# Patient Record
Sex: MALE | Race: OTHER | HISPANIC OR LATINO
[De-identification: names, ages, dates, MRNs, and addresses within clinical notes are randomized per-mention and may not be internally consistent; named-entity substitution may affect disease eponyms.]

---

## 2023-06-05 ENCOUNTER — APPOINTMENT (OUTPATIENT)
Dept: INTERNAL MEDICINE | Facility: CLINIC | Age: 43
End: 2023-06-05
Payer: MEDICAID

## 2023-06-05 ENCOUNTER — OUTPATIENT (OUTPATIENT)
Dept: OUTPATIENT SERVICES | Facility: HOSPITAL | Age: 43
LOS: 1 days | End: 2023-06-05

## 2023-06-05 VITALS
DIASTOLIC BLOOD PRESSURE: 80 MMHG | WEIGHT: 210 LBS | SYSTOLIC BLOOD PRESSURE: 130 MMHG | OXYGEN SATURATION: 97 % | BODY MASS INDEX: 31.1 KG/M2 | HEART RATE: 67 BPM | HEIGHT: 69 IN

## 2023-06-05 DIAGNOSIS — Z00.00 ENCOUNTER FOR GENERAL ADULT MEDICAL EXAMINATION W/OUT ABNORMAL FINDINGS: ICD-10-CM

## 2023-06-05 PROCEDURE — 93010 ELECTROCARDIOGRAM REPORT: CPT

## 2023-06-05 PROCEDURE — 99204 OFFICE O/P NEW MOD 45 MIN: CPT

## 2023-06-05 RX ORDER — SYRING-NEEDL,DISP,INSUL,0.3 ML 30 GX5/16"
SYRINGE, EMPTY DISPOSABLE MISCELLANEOUS
Qty: 1 | Refills: 0 | Status: ACTIVE | COMMUNITY
Start: 2023-06-05 | End: 1900-01-01

## 2023-06-05 RX ORDER — BLOOD-GLUCOSE METER
W/DEVICE KIT MISCELLANEOUS
Qty: 1 | Refills: 0 | Status: ACTIVE | COMMUNITY
Start: 2023-06-05 | End: 1900-01-01

## 2023-06-05 NOTE — PHYSICAL EXAM
[No Acute Distress] : no acute distress [PERRL] : pupils equal round and reactive to light [EOMI] : extraocular movements intact [Normal Outer Ear/Nose] : the outer ears and nose were normal in appearance [Supple] : supple [No Respiratory Distress] : no respiratory distress  [No Accessory Muscle Use] : no accessory muscle use [Clear to Auscultation] : lungs were clear to auscultation bilaterally [Normal Rate] : normal rate  [Regular Rhythm] : with a regular rhythm [Normal S1, S2] : normal S1 and S2 [Pedal Pulses Present] : the pedal pulses are present [No Edema] : there was no peripheral edema [Soft] : abdomen soft [Non Tender] : non-tender [Non-distended] : non-distended [Normal Bowel Sounds] : normal bowel sounds [No Joint Swelling] : no joint swelling [Grossly Normal Strength/Tone] : grossly normal strength/tone [No Focal Deficits] : no focal deficits [Normal Affect] : the affect was normal [Normal Insight/Judgement] : insight and judgment were intact [de-identified] : R TM with irregular surface; crowded oropharynx-Mallampati score IV [de-identified] : no skin breakdown noted. Palm test wnl

## 2023-06-05 NOTE — INTERPRETER SERVICES
[Patient Declined  Services] : - None: Patient declined  services [FreeTextEntry3] : Pt prefers to speak in Khmer w provider  [TWNoteComboBox1] : [place]ayan

## 2023-06-05 NOTE — PLAN
[FreeTextEntry1] : \par Patient is a 42 year old M with HTN, HLD, DM, fatty liver disease, ?gout coming in to establish care \par \par #snoring\par - possibly in the setting of DIALLO\par - will refer to sleep medicine\par \par #HTN\par - BP slightly elevated, reports not taking BP meds today\par - cont to monitor at home\par - meds recent\par -will obtain UA today, EKG at another visit\par \par #DM2\par - will obtain A1c, albumin/cr U, B12 \par - on metformin 1000mg and a gliptin from  (combo pill)\par -will discussed medication management next time, may benefit for GLP1 a\par - discussed diabetic diet, declines nutrition consult \par -diabetes supplies sent \par \par #HLD\par - will obtain lipid profile \par -will discuss statin initiation during next visit \par \par #gout?\par -not consistent with history and physical exam\par - will obtain uric acid and cont to monitor for symps\par \par #smoking\par -discussed risks of smoking and benefits of quitting\par - pt not interested at this time\par \par #feet pain\par - advised to wear good supporting shoes, avoid prolonged standing, wt loss\par -podiatry referral \par \par #TM abnormality\par - pt not interested in evaluating at this time, declines ENT consultation\par \par #HCM\par -advised to provide vaccination and medical records\par \par f/u in 1 wk for TTM \par

## 2023-06-05 NOTE — HISTORY OF PRESENT ILLNESS
[FreeTextEntry1] : establish care  [de-identified] : \par Patient is a 42 year old M with HTN, HLD, DM, fatty liver disease, ?gout coming in to establish care \par \par Patient reports issues with his sleep, wakes up fatigued. Endorses snoring. Endorses that he stops breathing and gasps for air in his sleep. Wakes up 2-3 times in the night- sometimes he needs to urinate, sometimes the snores wake him up. Endorses dry mouth in the AM but no HA. \par \par Patient reports smoking for 20 yrs, smokes 10-20 cigs a day. Reports he has tried to quit before but then resumes. Triggers include going to the [employer] or watching sports. Has not tried any medication for this. Not interested in quitting at this time. \par \par Pt reports feeling tired and pain on the plants of his feet after standing for long time. Endorses having flat feet. Tries to wear supportive shoes although noted to be wearing flat sandals today. \par \par Pt reports being diagnosed with DM2 in [place]. Pt does not check his FS. Denies any increased thirst, wt loss, or urinary frequency. Pt reports eating carb rich foods including white rice, eats multiple times a day. Does not exercise. Not interested in following with a nutritionist. Has not followed with a podiatry or ophthalmologist. \par \par Pt reports being told he had gout but denies any joint complaints and was never on any treatment for this.

## 2023-06-06 DIAGNOSIS — I10 ESSENTIAL (PRIMARY) HYPERTENSION: ICD-10-CM

## 2023-06-06 DIAGNOSIS — E11.9 TYPE 2 DIABETES MELLITUS WITHOUT COMPLICATIONS: ICD-10-CM

## 2023-06-06 DIAGNOSIS — Z00.00 ENCOUNTER FOR GENERAL ADULT MEDICAL EXAMINATION WITHOUT ABNORMAL FINDINGS: ICD-10-CM

## 2023-06-06 DIAGNOSIS — R06.83 SNORING: ICD-10-CM

## 2023-06-06 LAB
25(OH)D3 SERPL-MCNC: 27.9 NG/ML
ALBUMIN SERPL ELPH-MCNC: 5 G/DL
ALP BLD-CCNC: 78 U/L
ALT SERPL-CCNC: 32 U/L
ANION GAP SERPL CALC-SCNC: 14 MMOL/L
APPEARANCE: CLEAR
AST SERPL-CCNC: 22 U/L
BACTERIA: NEGATIVE /HPF
BASOPHILS # BLD AUTO: 0.01 K/UL
BASOPHILS NFR BLD AUTO: 0.1 %
BILIRUB SERPL-MCNC: 0.5 MG/DL
BILIRUBIN URINE: NEGATIVE
BLOOD URINE: NEGATIVE
BUN SERPL-MCNC: 17 MG/DL
C TRACH RRNA SPEC QL NAA+PROBE: NOT DETECTED
CALCIUM SERPL-MCNC: 9.6 MG/DL
CAST: 4 /LPF
CHLORIDE SERPL-SCNC: 101 MMOL/L
CHOLEST SERPL-MCNC: 247 MG/DL
CO2 SERPL-SCNC: 26 MMOL/L
COLOR: NORMAL
CREAT SERPL-MCNC: 0.85 MG/DL
CREAT SPEC-SCNC: 352 MG/DL
EGFR: 110 ML/MIN/1.73M2
EOSINOPHIL # BLD AUTO: 0.18 K/UL
EOSINOPHIL NFR BLD AUTO: 2.2 %
EPITHELIAL CELLS: 1 /HPF
ESTIMATED AVERAGE GLUCOSE: 140 MG/DL
GLUCOSE QUALITATIVE U: NEGATIVE MG/DL
GLUCOSE SERPL-MCNC: 155 MG/DL
HBA1C MFR BLD HPLC: 6.5 %
HCT VFR BLD CALC: 44.2 %
HDLC SERPL-MCNC: 33 MG/DL
HGB BLD-MCNC: 15.1 G/DL
HIV1+2 AB SPEC QL IA.RAPID: NONREACTIVE
IMM GRANULOCYTES NFR BLD AUTO: 0.4 %
KETONES URINE: ABNORMAL MG/DL
LDLC SERPL CALC-MCNC: 149 MG/DL
LDLC SERPL DIRECT ASSAY-MCNC: 160 MG/DL
LEUKOCYTE ESTERASE URINE: NEGATIVE
LYMPHOCYTES # BLD AUTO: 3.41 K/UL
LYMPHOCYTES NFR BLD AUTO: 40.8 %
MAN DIFF?: NORMAL
MCHC RBC-ENTMCNC: 29.1 PG
MCHC RBC-ENTMCNC: 34.2 GM/DL
MCV RBC AUTO: 85.2 FL
MICROALBUMIN 24H UR DL<=1MG/L-MCNC: 32.3 MG/DL
MICROALBUMIN/CREAT 24H UR-RTO: 92 MG/G
MICROSCOPIC-UA: NORMAL
MONOCYTES # BLD AUTO: 0.48 K/UL
MONOCYTES NFR BLD AUTO: 5.7 %
N GONORRHOEA RRNA SPEC QL NAA+PROBE: NOT DETECTED
NEUTROPHILS # BLD AUTO: 4.24 K/UL
NEUTROPHILS NFR BLD AUTO: 50.8 %
NITRITE URINE: NEGATIVE
NONHDLC SERPL-MCNC: 214 MG/DL
PH URINE: 5.5
PLATELET # BLD AUTO: 309 K/UL
POTASSIUM SERPL-SCNC: 3.9 MMOL/L
PROT SERPL-MCNC: 7.6 G/DL
PROTEIN URINE: 100 MG/DL
RBC # BLD: 5.19 M/UL
RBC # FLD: 13.2 %
RED BLOOD CELLS URINE: 1 /HPF
SODIUM SERPL-SCNC: 141 MMOL/L
SOURCE AMPLIFICATION: NORMAL
SPECIFIC GRAVITY URINE: 1.03
T PALLIDUM AB SER QL IA: NEGATIVE
TRIGL SERPL-MCNC: 322 MG/DL
URATE SERPL-MCNC: 7.9 MG/DL
UROBILINOGEN URINE: 0.2 MG/DL
VIT B12 SERPL-MCNC: 557 PG/ML
WBC # FLD AUTO: 8.35 K/UL
WHITE BLOOD CELLS URINE: 2 /HPF

## 2023-06-07 LAB
HBV CORE IGG+IGM SER QL: REACTIVE
HBV SURFACE AB SER QL: REACTIVE
HBV SURFACE AG SER QL: NONREACTIVE
HCV AB SER QL: NONREACTIVE
HCV S/CO RATIO: 0.11 S/CO

## 2023-06-20 ENCOUNTER — APPOINTMENT (OUTPATIENT)
Dept: INTERNAL MEDICINE | Facility: CLINIC | Age: 43
End: 2023-06-20
Payer: MEDICAID

## 2023-06-20 ENCOUNTER — OUTPATIENT (OUTPATIENT)
Dept: OUTPATIENT SERVICES | Facility: HOSPITAL | Age: 43
LOS: 1 days | End: 2023-06-20

## 2023-06-20 VITALS — HEIGHT: 69 IN | BODY MASS INDEX: 31.1 KG/M2 | WEIGHT: 210 LBS

## 2023-06-20 PROCEDURE — ZZZZZ: CPT

## 2023-06-20 NOTE — PLAN
[FreeTextEntry1] : \par Patient is a 42 year old M with HTN, HLD, DM, fatty liver disease, coming in to discuss lab results and telephonic check\par \par #DM2\par - A1c 6.5\par - cont on metformin 1000mg BID \par - may benefit for GLP1 a given obesity, but will discuss that at an in-person visit \par - declined nutritional consultation\par -diabetes supplies sent last time \par \par #snoring\par - possibly in the setting of DIALLO\par - was referred to sleep medicine \par \par #HTN\par - on amlodipine 10mg and ibersartan 300mg daily \par -will obtain UA today, EKG at another visit \par \par #HLD\par - LDL-160, ASCVD score 25.96%\par - will given smoking, DM2, and elevated risk will initiate high dose statin  \par \par #smoking\par -will cont to address next time \par - pt expressed no interest in quitting \par \par #feet pain\par -podiatry referral provided last visit \par \par #TM abnormality\par - pt not interested in evaluating at this time, declines ENT consultation\par \par #vit D insufficiency\par - sent vitamin D 1,000 U q D. \par \par #HCM\par -advised to provide vaccination and medical records\par \par f/u in  1 mo

## 2023-06-20 NOTE — HISTORY OF PRESENT ILLNESS
[Home] : at home, [unfilled] , at the time of the visit. [Medical Office: ([facility])___] : at the medical office located in  [Verbal consent obtained from patient] : the patient, [unfilled] [de-identified] : \par Patient is a 42 year old M with HTN, HLD, DM, fatty liver disease coming in for a follow up.\par \par  Requests sleep medicine referral as it was not provided last time. Reports that he is concerned about his sleep. Patient denies any other acute concern.\par \par Pt reports not currently taking any medication for his diabetes at this time. Was previously taking metformin/gliptin combo pill from . Endorses compliance with BP medication.

## 2023-06-21 DIAGNOSIS — E78.5 HYPERLIPIDEMIA, UNSPECIFIED: ICD-10-CM

## 2023-06-21 DIAGNOSIS — I10 ESSENTIAL (PRIMARY) HYPERTENSION: ICD-10-CM

## 2023-06-21 DIAGNOSIS — E55.9 VITAMIN D DEFICIENCY, UNSPECIFIED: ICD-10-CM

## 2023-06-21 DIAGNOSIS — E11.9 TYPE 2 DIABETES MELLITUS WITHOUT COMPLICATIONS: ICD-10-CM

## 2023-06-21 DIAGNOSIS — R06.83 SNORING: ICD-10-CM

## 2023-07-13 ENCOUNTER — OUTPATIENT (OUTPATIENT)
Dept: OUTPATIENT SERVICES | Facility: HOSPITAL | Age: 43
LOS: 1 days | End: 2023-07-13

## 2023-07-13 ENCOUNTER — NON-APPOINTMENT (OUTPATIENT)
Age: 43
End: 2023-07-13

## 2023-07-13 ENCOUNTER — APPOINTMENT (OUTPATIENT)
Dept: INTERNAL MEDICINE | Facility: CLINIC | Age: 43
End: 2023-07-13
Payer: MEDICAID

## 2023-07-13 VITALS
RESPIRATION RATE: 16 BRPM | OXYGEN SATURATION: 97 % | SYSTOLIC BLOOD PRESSURE: 114 MMHG | TEMPERATURE: 97.4 F | HEART RATE: 75 BPM | BODY MASS INDEX: 30.81 KG/M2 | HEIGHT: 69 IN | DIASTOLIC BLOOD PRESSURE: 68 MMHG | WEIGHT: 208 LBS

## 2023-07-13 PROCEDURE — 99214 OFFICE O/P EST MOD 30 MIN: CPT

## 2023-07-13 RX ORDER — METFORMIN HYDROCHLORIDE 1000 MG/1
1000 TABLET, COATED ORAL TWICE DAILY
Qty: 2 | Refills: 0 | Status: DISCONTINUED | COMMUNITY
Start: 2023-06-05 | End: 2023-07-13

## 2023-07-13 RX ORDER — BLOOD-GLUCOSE METER
KIT MISCELLANEOUS
Qty: 1 | Refills: 0 | Status: ACTIVE | COMMUNITY
Start: 2023-06-05 | End: 1900-01-01

## 2023-07-13 NOTE — PHYSICAL EXAM
[No Acute Distress] : no acute distress [Supple] : supple [No Respiratory Distress] : no respiratory distress  [No Accessory Muscle Use] : no accessory muscle use [Normal Rate] : normal rate  [Regular Rhythm] : with a regular rhythm [Normal S1, S2] : normal S1 and S2 [No Edema] : there was no peripheral edema [Soft] : abdomen soft [Non Tender] : non-tender [Non-distended] : non-distended [Normal Bowel Sounds] : normal bowel sounds [Grossly Normal Strength/Tone] : grossly normal strength/tone

## 2023-07-13 NOTE — PLAN
[FreeTextEntry1] : \par Patient is a 42 year old M with HTN, HLD, DM, fatty liver disease, coming in for f/u\par \par #DM2\par - A1c 6.5 in June\par - will switch to metformin ER 500mg BID as last A1c was controlled, not interested in any other medication\par - declined nutritional consultation, offered referral, discussed healthy eating \par -diabetes supplies sent last time, does not measure FS,  advised to monitor FS  given that meds were adjusted\par -pending to travel to [place] if unable to make it to appt, should  f/u w  in [place]. \par \par #snoring\par - possibly in the setting of DIALLO\par - was referred to sleep medicine \par \par #HTN\par - on amlodipine 10mg and ibersartan 300mg daily \par - EKG today NSR wnl\par -UA and electrolyte panel stable last time \par -BP monitor sent again, advised to monitor BP at home \par \par #HLD\par - LDL-160, ASCVD score 25.96%\par - on statin\par - lipid panel/CMP \par \par #smoking\par - pt expressed no interest in quitting \par - will cont to monitor\par \par \par #TM abnormality\par - pt was not interested in further eval by ENT\par \par #vit D insufficiency\par - sent vitamin D 1,000 U q D. \par \par #HCM\par -advised to provide vaccination and medical records\par \par f/u in  2 mo

## 2023-07-13 NOTE — HISTORY OF PRESENT ILLNESS
[FreeTextEntry1] : f/u [de-identified] : \par Patient is a 42 year old M with HTN, HLD, DM, fatty liver disease coming in for a follow up.\par \par Patient reports having GI side effects of metformin- stomach up sent. Interested in cutting down on this medication. Not interested in GLP-1 a or other medication. Pt reports having issues w healthy eating. Pt reports eating rice, beef, pasta, bread. Pt is interested in healthy eating but declines talking to nutritionist today. Pt does not check his FS as it hurts his fingers. \par \par Endorses compliance with BP medication but does not check his BP.

## 2023-07-13 NOTE — INTERPRETER SERVICES
[Patient Declined  Services] : - None: Patient declined  services [FreeTextEntry3] : Pt prefers to speak in Tamaz[place] w provider [TWNoteComboBox1] : Burkinan

## 2023-07-14 DIAGNOSIS — E11.9 TYPE 2 DIABETES MELLITUS WITHOUT COMPLICATIONS: ICD-10-CM

## 2023-07-14 DIAGNOSIS — E78.5 HYPERLIPIDEMIA, UNSPECIFIED: ICD-10-CM

## 2023-07-14 DIAGNOSIS — I10 ESSENTIAL (PRIMARY) HYPERTENSION: ICD-10-CM

## 2023-07-14 LAB
ALBUMIN SERPL ELPH-MCNC: 5 G/DL
ALP BLD-CCNC: 73 U/L
ALT SERPL-CCNC: 28 U/L
ANION GAP SERPL CALC-SCNC: 13 MMOL/L
AST SERPL-CCNC: 21 U/L
BILIRUB SERPL-MCNC: 0.4 MG/DL
BUN SERPL-MCNC: 21 MG/DL
CALCIUM SERPL-MCNC: 9.6 MG/DL
CHLORIDE SERPL-SCNC: 104 MMOL/L
CHOLEST SERPL-MCNC: 129 MG/DL
CO2 SERPL-SCNC: 24 MMOL/L
CREAT SERPL-MCNC: 0.89 MG/DL
EGFR: 108 ML/MIN/1.73M2
GLUCOSE SERPL-MCNC: 118 MG/DL
HDLC SERPL-MCNC: 33 MG/DL
LDLC SERPL CALC-MCNC: 77 MG/DL
LDLC SERPL DIRECT ASSAY-MCNC: 72 MG/DL
NONHDLC SERPL-MCNC: 96 MG/DL
POTASSIUM SERPL-SCNC: 4.4 MMOL/L
PROT SERPL-MCNC: 7.5 G/DL
SODIUM SERPL-SCNC: 141 MMOL/L
TRIGL SERPL-MCNC: 101 MG/DL

## 2023-07-24 ENCOUNTER — APPOINTMENT (OUTPATIENT)
Dept: PULMONOLOGY | Facility: CLINIC | Age: 43
End: 2023-07-24
Payer: MEDICAID

## 2023-07-24 VITALS
DIASTOLIC BLOOD PRESSURE: 78 MMHG | SYSTOLIC BLOOD PRESSURE: 112 MMHG | RESPIRATION RATE: 14 BRPM | WEIGHT: 208 LBS | HEART RATE: 66 BPM | BODY MASS INDEX: 30.72 KG/M2 | OXYGEN SATURATION: 98 %

## 2023-07-24 DIAGNOSIS — R06.83 SNORING: ICD-10-CM

## 2023-07-24 PROCEDURE — 94726 PLETHYSMOGRAPHY LUNG VOLUMES: CPT

## 2023-07-24 PROCEDURE — 99203 OFFICE O/P NEW LOW 30 MIN: CPT | Mod: 25

## 2023-07-24 PROCEDURE — 94060 EVALUATION OF WHEEZING: CPT

## 2023-07-24 PROCEDURE — 94729 DIFFUSING CAPACITY: CPT

## 2023-07-24 NOTE — HISTORY OF PRESENT ILLNESS
[Current] : current [Never] : never [TextBox_4] : Patient is a 42-year-old male past medical history significant for hypertension high cholesterol diabetes active smoker who presents today for pulmonary consult.  The patient complains of cough shortness of breath dyspnea on on exertion as well is nonrestorable sleep morning headaches.  Patient had a recent chest x-ray which was negative.  He currently denies fevers chills chest pain weight loss or hemoptysis

## 2023-07-24 NOTE — PHYSICAL EXAM
[No Acute Distress] : no acute distress [Normal Oropharynx] : normal oropharynx [III] : Mallampati Class: III [Normal Appearance] : normal appearance [No Neck Mass] : no neck mass [Normal Rate/Rhythm] : normal rate/rhythm [Normal S1, S2] : normal s1, s2 [No Murmurs] : no murmurs [No Resp Distress] : no resp distress [Rhonchi] : rhonchi [No Abnormalities] : no abnormalities [Benign] : benign [Normal Gait] : normal gait [No Clubbing] : no clubbing [No Cyanosis] : no cyanosis [No Edema] : no edema [FROM] : FROM [Normal Color/ Pigmentation] : normal color/ pigmentation [No Focal Deficits] : no focal deficits [Oriented x3] : oriented x3 [Normal Affect] : normal affect

## 2023-07-24 NOTE — ASSESSMENT
[FreeTextEntry1] : In summary the patient is a 42-year-old male with hypertension diabetes active smoker who is referred for pulmonary consult.  His physical exam is significant for Mallampati score 3.  He underwent a pulmonary function test which revealed mild obstructive airways disease.  A home sleep monitor has been ordered.  The patient has been started on Trelegy and is instructed to follow-up in 2 weeks

## 2023-07-24 NOTE — REASON FOR VISIT
[Consultation] : a consultation [Sleep Apnea] : sleep apnea [Cough] : cough [COPD] : COPD [Shortness of Breath] : shortness of breath [Wheezing] : wheezing

## 2023-08-07 ENCOUNTER — APPOINTMENT (OUTPATIENT)
Dept: PULMONOLOGY | Facility: CLINIC | Age: 43
End: 2023-08-07

## 2023-08-09 ENCOUNTER — APPOINTMENT (OUTPATIENT)
Dept: PULMONOLOGY | Facility: CLINIC | Age: 43
End: 2023-08-09
Payer: MEDICAID

## 2023-08-09 VITALS
SYSTOLIC BLOOD PRESSURE: 131 MMHG | OXYGEN SATURATION: 98 % | HEIGHT: 69 IN | DIASTOLIC BLOOD PRESSURE: 84 MMHG | HEART RATE: 83 BPM | RESPIRATION RATE: 12 BRPM

## 2023-08-09 DIAGNOSIS — Z80.7 FAMILY HISTORY OF OTHER MALIGNANT NEOPLASMS OF LYMPHOID, HEMATOPOIETIC AND RELATED TISSUES: ICD-10-CM

## 2023-08-09 DIAGNOSIS — F17.200 NICOTINE DEPENDENCE, UNSPECIFIED, UNCOMPLICATED: ICD-10-CM

## 2023-08-09 DIAGNOSIS — Z86.39 PERSONAL HISTORY OF OTHER ENDOCRINE, NUTRITIONAL AND METABOLIC DISEASE: ICD-10-CM

## 2023-08-09 PROCEDURE — 99214 OFFICE O/P EST MOD 30 MIN: CPT

## 2023-08-09 RX ORDER — LANCETS 33 GAUGE
EACH MISCELLANEOUS
Qty: 1 | Refills: 6 | Status: ACTIVE | COMMUNITY
Start: 2023-06-05 | End: 1900-01-01

## 2023-08-09 RX ORDER — BLOOD SUGAR DIAGNOSTIC
STRIP MISCELLANEOUS
Qty: 180 | Refills: 1 | Status: ACTIVE | COMMUNITY
Start: 2023-06-05 | End: 1900-01-01

## 2023-08-09 RX ORDER — FLUTICASONE FUROATE, UMECLIDINIUM BROMIDE AND VILANTEROL TRIFENATATE 100; 62.5; 25 UG/1; UG/1; UG/1
100-62.5-25 POWDER RESPIRATORY (INHALATION) DAILY
Qty: 1 | Refills: 6 | Status: ACTIVE | COMMUNITY
Start: 2023-08-09 | End: 1900-01-01

## 2023-08-09 NOTE — ASSESSMENT
[FreeTextEntry1] : In summary the patient is a 42-year-old male with COPD probable obstructive sleep apnea diabetes and hypertension high cholesterol who presents for follow-up.  His physical exam is significant for improved air entry as well as a Mallampati score 3.  He states that his Trelegy has improved his symptoms and therefore prescription has been renewed.  He is currently awaiting a home sleep study and is instructed to follow-up in 6 weeks

## 2023-08-09 NOTE — HISTORY OF PRESENT ILLNESS
[Current] : current [Never] : never [TextBox_4] : Patient is a 42-year-old male past medical history significant for diabetes hypertension high cholesterol mild COPD who presents today for follow-up.  The patient states that his cough and shortness of breath have improved with the use of Trelegy.  He currently denies fevers chills chest pain weight loss or hemoptysis

## 2023-09-28 ENCOUNTER — APPOINTMENT (OUTPATIENT)
Dept: INTERNAL MEDICINE | Facility: CLINIC | Age: 43
End: 2023-09-28

## 2023-11-20 ENCOUNTER — APPOINTMENT (OUTPATIENT)
Dept: PULMONOLOGY | Facility: CLINIC | Age: 43
End: 2023-11-20

## 2024-02-16 ENCOUNTER — OUTPATIENT (OUTPATIENT)
Dept: OUTPATIENT SERVICES | Facility: HOSPITAL | Age: 44
LOS: 1 days | End: 2024-02-16

## 2024-02-16 ENCOUNTER — APPOINTMENT (OUTPATIENT)
Dept: INTERNAL MEDICINE | Facility: CLINIC | Age: 44
End: 2024-02-16
Payer: COMMERCIAL

## 2024-02-16 VITALS
HEIGHT: 69 IN | WEIGHT: 205 LBS | SYSTOLIC BLOOD PRESSURE: 120 MMHG | HEART RATE: 67 BPM | OXYGEN SATURATION: 97 % | BODY MASS INDEX: 30.36 KG/M2 | DIASTOLIC BLOOD PRESSURE: 80 MMHG

## 2024-02-16 DIAGNOSIS — E78.5 HYPERLIPIDEMIA, UNSPECIFIED: ICD-10-CM

## 2024-02-16 DIAGNOSIS — E11.9 TYPE 2 DIABETES MELLITUS WITHOUT COMPLICATIONS: ICD-10-CM

## 2024-02-16 DIAGNOSIS — I10 ESSENTIAL (PRIMARY) HYPERTENSION: ICD-10-CM

## 2024-02-16 DIAGNOSIS — E55.9 VITAMIN D DEFICIENCY, UNSPECIFIED: ICD-10-CM

## 2024-02-16 PROCEDURE — 99214 OFFICE O/P EST MOD 30 MIN: CPT

## 2024-02-16 RX ORDER — CHROMIUM 200 MCG
25 MCG TABLET ORAL DAILY
Qty: 90 | Refills: 1 | Status: ACTIVE | COMMUNITY
Start: 2023-06-20 | End: 1900-01-01

## 2024-02-16 NOTE — HISTORY OF PRESENT ILLNESS
[FreeTextEntry1] : Follow up visit for chronic medical conditions.  [de-identified] : Patient is a 43-year-old M with HTN, HLD, DM, fatty liver disease coming in for a follow up visit for chronic medical conditions.   # HTN- patient endorsed medication compliance. Home BP #s - 120-134 for SBP. he does not know DBP #s.   # T2DM- Endorsed medication compliance w/ Metformin ER 500mg BID. He is able to tolerate the dose without any GI side effects. He checks his BS occasionally.  FS in the Am- 110-120 and Before lunch 150s. Not interested in GLP-1 or any other medication.  He has not made any changes in his dietary habits since the last visit. Diet is loaded with carbs with rice/pasta/bread as the biggest portion of the diet. He drinks sodas/ juices occasionally.  He declined nutritionist referral. He refused to make any dietary changes.

## 2024-02-16 NOTE — INTERPRETER SERVICES
[[employer] ] : provided by [employer]   [Interpreters_IDNumber] : [id number] [Interpreters_FullName] : Amadeo [TWNoteComboBox1] : [place]

## 2024-02-16 NOTE — ASSESSMENT
[FreeTextEntry1] : The plan of care was discussed with the patient in full detail. He verbalized understanding and in agreement with the plan of care.   RTC for CPE in June.

## 2024-02-16 NOTE — PHYSICAL EXAM
[No Respiratory Distress] : no respiratory distress  [No Accessory Muscle Use] : no accessory muscle use [Clear to Auscultation] : lungs were clear to auscultation bilaterally [Pedal Pulses Present] : the pedal pulses are present [No Edema] : there was no peripheral edema [Soft] : abdomen soft [Non Tender] : non-tender [Non-distended] : non-distended [Normal Bowel Sounds] : normal bowel sounds [Normal Supraclavicular Nodes] : no supraclavicular lymphadenopathy [Normal Posterior Cervical Nodes] : no posterior cervical lymphadenopathy [Normal Anterior Cervical Nodes] : no anterior cervical lymphadenopathy [Normal] : no joint swelling and grossly normal strength and tone [Normal Affect] : the affect was normal [Alert and Oriented x3] : oriented to person, place, and time [Normal Mood] : the mood was normal [Normal Insight/Judgement] : insight and judgment were intact

## 2024-02-17 LAB
25(OH)D3 SERPL-MCNC: 35.7 NG/ML
ALBUMIN SERPL ELPH-MCNC: 4.9 G/DL
ALP BLD-CCNC: 75 U/L
ALT SERPL-CCNC: 25 U/L
ANION GAP SERPL CALC-SCNC: 15 MMOL/L
AST SERPL-CCNC: 19 U/L
BILIRUB SERPL-MCNC: 0.3 MG/DL
BUN SERPL-MCNC: 13 MG/DL
CALCIUM SERPL-MCNC: 10.2 MG/DL
CHLORIDE SERPL-SCNC: 98 MMOL/L
CHOLEST SERPL-MCNC: 191 MG/DL
CO2 SERPL-SCNC: 23 MMOL/L
CREAT SERPL-MCNC: 0.7 MG/DL
EGFR: 116 ML/MIN/1.73M2
ESTIMATED AVERAGE GLUCOSE: 131 MG/DL
GLUCOSE SERPL-MCNC: 96 MG/DL
HBA1C MFR BLD HPLC: 6.2 %
HDLC SERPL-MCNC: 39 MG/DL
LDLC SERPL CALC-MCNC: 119 MG/DL
LDLC SERPL DIRECT ASSAY-MCNC: 124 MG/DL
NONHDLC SERPL-MCNC: 152 MG/DL
POTASSIUM SERPL-SCNC: 3.9 MMOL/L
PROT SERPL-MCNC: 7.7 G/DL
SODIUM SERPL-SCNC: 137 MMOL/L
TRIGL SERPL-MCNC: 188 MG/DL
VIT B12 SERPL-MCNC: 520 PG/ML

## 2024-05-23 ENCOUNTER — OUTPATIENT (OUTPATIENT)
Dept: OUTPATIENT SERVICES | Facility: HOSPITAL | Age: 44
LOS: 1 days | End: 2024-05-23

## 2024-05-23 ENCOUNTER — APPOINTMENT (OUTPATIENT)
Dept: INTERNAL MEDICINE | Facility: CLINIC | Age: 44
End: 2024-05-23
Payer: MEDICAID

## 2024-05-23 VITALS
SYSTOLIC BLOOD PRESSURE: 116 MMHG | HEART RATE: 74 BPM | BODY MASS INDEX: 30.81 KG/M2 | HEIGHT: 69 IN | WEIGHT: 208 LBS | DIASTOLIC BLOOD PRESSURE: 80 MMHG

## 2024-05-23 DIAGNOSIS — E11.9 TYPE 2 DIABETES MELLITUS W/OUT COMPLICATIONS: ICD-10-CM

## 2024-05-23 DIAGNOSIS — J44.9 CHRONIC OBSTRUCTIVE PULMONARY DISEASE, UNSPECIFIED: ICD-10-CM

## 2024-05-23 DIAGNOSIS — E11.9 TYPE 2 DIABETES MELLITUS WITHOUT COMPLICATIONS: ICD-10-CM

## 2024-05-23 DIAGNOSIS — G47.33 OBSTRUCTIVE SLEEP APNEA (ADULT) (PEDIATRIC): ICD-10-CM

## 2024-05-23 PROCEDURE — 99214 OFFICE O/P EST MOD 30 MIN: CPT

## 2024-05-23 RX ORDER — AMLODIPINE BESYLATE 10 MG/1
10 TABLET ORAL DAILY
Qty: 90 | Refills: 1 | Status: ACTIVE | COMMUNITY
Start: 2023-06-05 | End: 1900-01-01

## 2024-05-23 RX ORDER — METFORMIN ER 750 MG 750 MG/1
750 TABLET ORAL
Qty: 180 | Refills: 1 | Status: ACTIVE | COMMUNITY
Start: 2023-07-13 | End: 1900-01-01

## 2024-05-23 RX ORDER — IRBESARTAN 300 MG/1
300 TABLET ORAL DAILY
Qty: 90 | Refills: 1 | Status: ACTIVE | COMMUNITY
Start: 2023-06-05 | End: 1900-01-01

## 2024-05-23 RX ORDER — FLASH GLUCOSE SCANNING READER
EACH MISCELLANEOUS
Qty: 1 | Refills: 0 | Status: ACTIVE | COMMUNITY
Start: 2024-05-23 | End: 1900-01-01

## 2024-05-23 RX ORDER — FLASH GLUCOSE SENSOR
KIT MISCELLANEOUS
Qty: 2 | Refills: 11 | Status: ACTIVE | COMMUNITY
Start: 2024-05-23 | End: 1900-01-01

## 2024-05-23 RX ORDER — ROSUVASTATIN CALCIUM 40 MG/1
40 TABLET, FILM COATED ORAL DAILY
Qty: 1 | Refills: 1 | Status: ACTIVE | COMMUNITY
Start: 2023-06-20 | End: 1900-01-01

## 2024-05-23 NOTE — INTERPRETER SERVICES
[Patient Declined  Services] : - None: Patient declined  services [FreeTextEntry3] : Pt prefers to speak to provider in Malaysian  [TWNoteComboBox1] : Serbian

## 2024-05-23 NOTE — PLAN
[FreeTextEntry1] : Patient is a 43 year old M with HTN, HLD, DM, fatty liver disease coming in for a follow up.  #DM2 - A1c 6.2 in Feb, will repeat today  - on metformin 750mg ER BID - optho, podiatry referral provided -pt requesting GCM as he says he cannot tolerate pricking his finger  #DIALLO - reports on and off compliance with CPAP -advised to fu with pulmonology/sleep medicine   #hx of COPD/active smoker -noncompliant with inhaler -discussed importance of smoking cessation, pt understands the risks of smoking and declines smoking cessation counseling -advised to fu w pulm   #HTN -stable - on amlodipine 10mg and irbesartan 300mg daily   #HLD --endorsing noncompliance w statin, advised to take it consistently - rosuvastatin 40mg renewed, repeat lipid panel once pt is compliant   #vit D insufficiency - on vitamin D 1000U daily  #HCM -declines GI referral or FIT testing despite counseling, prefers to discuss routine care at another time -advised to provide vaccination and medical records  fu in 1-3 mo for CPE

## 2024-05-23 NOTE — HISTORY OF PRESENT ILLNESS
[FreeTextEntry1] : f/u [de-identified] : Patient is a 43 year old M with HTN, HLD, DM, fatty liver disease coming in for a follow up.  Patient continues to smoke a pack and half during the nighttime. Has been smoking for 25 yrs. Not interested in smoking cessation. Pt denies any respiratory complaint. Denies any shortness of breath, wheezing, RUBALCAVA or other respiratory symp. Pt does not use inhaler. Pt also with hx of DIALLO, endorses he has difficulty being compliant with CPAP machine. He has not recently seen his pulmonologist.   Pt reports compliance with his medication aside from statin which he does not take consistently. Does not check FS routine as he does not like pricking himself. He would like a CGM.  Pt has not followed with optho or podiatry.  Pt declines colonoscopy or FIT testing. Wishes to discuss routine care at another time.

## 2024-05-23 NOTE — PHYSICAL EXAM
[No Acute Distress] : no acute distress [Supple] : supple [No Respiratory Distress] : no respiratory distress  [No Accessory Muscle Use] : no accessory muscle use [Clear to Auscultation] : lungs were clear to auscultation bilaterally [Normal Rate] : normal rate  [Regular Rhythm] : with a regular rhythm [Normal S1, S2] : normal S1 and S2 [No Edema] : there was no peripheral edema [Soft] : abdomen soft [Non Tender] : non-tender [Non-distended] : non-distended [Normal Bowel Sounds] : normal bowel sounds [Grossly Normal Strength/Tone] : grossly normal strength/tone

## 2024-05-24 ENCOUNTER — NON-APPOINTMENT (OUTPATIENT)
Age: 44
End: 2024-05-24

## 2024-05-24 LAB
ESTIMATED AVERAGE GLUCOSE: 140 MG/DL
HBA1C MFR BLD HPLC: 6.5 %

## 2024-07-02 ENCOUNTER — RX RENEWAL (OUTPATIENT)
Age: 44
End: 2024-07-02

## 2024-12-30 ENCOUNTER — RX RENEWAL (OUTPATIENT)
Age: 44
End: 2024-12-30

## 2025-03-25 ENCOUNTER — APPOINTMENT (OUTPATIENT)
Dept: INTERNAL MEDICINE | Facility: CLINIC | Age: 45
End: 2025-03-25
Payer: MEDICAID

## 2025-03-25 ENCOUNTER — OUTPATIENT (OUTPATIENT)
Dept: OUTPATIENT SERVICES | Facility: HOSPITAL | Age: 45
LOS: 1 days | End: 2025-03-25

## 2025-03-25 VITALS
DIASTOLIC BLOOD PRESSURE: 90 MMHG | HEART RATE: 81 BPM | BODY MASS INDEX: 31.84 KG/M2 | SYSTOLIC BLOOD PRESSURE: 130 MMHG | HEIGHT: 69 IN | OXYGEN SATURATION: 96 % | WEIGHT: 215 LBS

## 2025-03-25 DIAGNOSIS — G47.33 OBSTRUCTIVE SLEEP APNEA (ADULT) (PEDIATRIC): ICD-10-CM

## 2025-03-25 DIAGNOSIS — I10 ESSENTIAL (PRIMARY) HYPERTENSION: ICD-10-CM

## 2025-03-25 DIAGNOSIS — Z00.00 ENCOUNTER FOR GENERAL ADULT MEDICAL EXAMINATION WITHOUT ABNORMAL FINDINGS: ICD-10-CM

## 2025-03-25 DIAGNOSIS — Z87.898 PERSONAL HISTORY OF OTHER SPECIFIED CONDITIONS: ICD-10-CM

## 2025-03-25 DIAGNOSIS — E11.9 TYPE 2 DIABETES MELLITUS W/OUT COMPLICATIONS: ICD-10-CM

## 2025-03-25 DIAGNOSIS — J44.9 CHRONIC OBSTRUCTIVE PULMONARY DISEASE, UNSPECIFIED: ICD-10-CM

## 2025-03-25 DIAGNOSIS — E11.9 TYPE 2 DIABETES MELLITUS WITHOUT COMPLICATIONS: ICD-10-CM

## 2025-03-25 DIAGNOSIS — D17.0 BENIGN LIPOMATOUS NEOPLASM OF SKIN AND SUBCUTANEOUS TISSUE OF HEAD, FACE AND NECK: ICD-10-CM

## 2025-03-25 DIAGNOSIS — Z86.39 PERSONAL HISTORY OF OTHER ENDOCRINE, NUTRITIONAL AND METABOLIC DISEASE: ICD-10-CM

## 2025-03-25 DIAGNOSIS — Z00.00 ENCOUNTER FOR GENERAL ADULT MEDICAL EXAMINATION W/OUT ABNORMAL FINDINGS: ICD-10-CM

## 2025-03-25 PROCEDURE — 99396 PREV VISIT EST AGE 40-64: CPT

## 2025-04-04 LAB — HEMOCCULT STL QL IA: NEGATIVE

## 2025-08-13 ENCOUNTER — RX RENEWAL (OUTPATIENT)
Age: 45
End: 2025-08-13

## 2025-08-15 ENCOUNTER — RX RENEWAL (OUTPATIENT)
Age: 45
End: 2025-08-15